# Patient Record
Sex: FEMALE | Race: WHITE | NOT HISPANIC OR LATINO | URBAN - METROPOLITAN AREA
[De-identification: names, ages, dates, MRNs, and addresses within clinical notes are randomized per-mention and may not be internally consistent; named-entity substitution may affect disease eponyms.]

---

## 2024-02-29 PROBLEM — 111552007: Status: ACTIVE | Noted: 2024-02-29

## 2024-02-29 PROBLEM — 40930008: Status: ACTIVE | Noted: 2024-02-29

## 2024-02-29 PROBLEM — 35489007: Status: ACTIVE | Noted: 2024-02-29

## 2024-02-29 PROBLEM — 389026000: Status: ACTIVE | Noted: 2024-02-29

## 2024-02-29 PROBLEM — 235595009: Status: ACTIVE | Noted: 2024-02-29

## 2024-02-29 PROBLEM — 219006: Status: ACTIVE | Noted: 2024-02-29

## 2024-02-29 PROBLEM — 197321007: Status: ACTIVE | Noted: 2024-02-29

## 2024-02-29 PROBLEM — 231504006: Status: ACTIVE | Noted: 2024-02-29

## 2024-02-29 PROBLEM — 35400008: Status: ACTIVE | Noted: 2024-02-29

## 2024-02-29 PROBLEM — 443913008: Status: ACTIVE | Noted: 2024-02-29

## 2024-02-29 PROBLEM — 59621000: Status: ACTIVE | Noted: 2024-02-29

## 2024-03-01 ENCOUNTER — OV NP (OUTPATIENT)
Dept: URBAN - METROPOLITAN AREA CLINIC 86 | Facility: CLINIC | Age: 74
End: 2024-03-01
Payer: MEDICARE

## 2024-03-01 ENCOUNTER — LAB (OUTPATIENT)
Dept: URBAN - METROPOLITAN AREA CLINIC 86 | Facility: CLINIC | Age: 74
End: 2024-03-01

## 2024-03-01 VITALS
HEART RATE: 98 BPM | TEMPERATURE: 97.9 F | SYSTOLIC BLOOD PRESSURE: 140 MMHG | WEIGHT: 171 LBS | BODY MASS INDEX: 29.19 KG/M2 | DIASTOLIC BLOOD PRESSURE: 76 MMHG | HEIGHT: 64 IN

## 2024-03-01 DIAGNOSIS — F32.A DEPRESSION, UNSPECIFIED: ICD-10-CM

## 2024-03-01 DIAGNOSIS — Z78.9 ALCOHOL USE: ICD-10-CM

## 2024-03-01 DIAGNOSIS — K76.0 FATTY LIVER: ICD-10-CM

## 2024-03-01 DIAGNOSIS — K21.9 CHRONIC GERD: ICD-10-CM

## 2024-03-01 DIAGNOSIS — I10 ESSENTIAL HYPERTENSION: ICD-10-CM

## 2024-03-01 DIAGNOSIS — E13.9 OTHER SPECIFIED DIABETES MELLITUS WITHOUT COMPLICATION, WITHOUT LONG-TERM CURRENT USE OF INSULIN: ICD-10-CM

## 2024-03-01 DIAGNOSIS — F41.9 ANXIETY DISORDER, UNSPECIFIED: ICD-10-CM

## 2024-03-01 DIAGNOSIS — E03.9 HYPOTHYROIDISM, UNSPECIFIED TYPE: ICD-10-CM

## 2024-03-01 DIAGNOSIS — Z71.85 VACCINE COUNSELING: ICD-10-CM

## 2024-03-01 DIAGNOSIS — E83.110 HEREDITARY HEMOCHROMATOSIS: ICD-10-CM

## 2024-03-01 PROCEDURE — 99205 OFFICE O/P NEW HI 60 MIN: CPT

## 2024-03-01 RX ORDER — CHOLECALCIFEROL (VITAMIN D3) 25 MCG
1 CAPSULE CAPSULE ORAL ONCE A DAY
Status: ACTIVE | COMMUNITY

## 2024-03-01 RX ORDER — LEVOTHYROXINE SODIUM 0.1 MG/1
TAKE 1 TABLET (100 MCG) BY ORAL ROUTE ONCE DAILY TABLET ORAL 1
Qty: 0 | Refills: 0 | Status: ACTIVE | COMMUNITY
Start: 1900-01-01

## 2024-03-01 RX ORDER — METFORMIN HYDROCHLORIDE 500 MG/1
1 TABLET WITH A MEAL TABLET, FILM COATED ORAL ONCE A DAY
Status: ACTIVE | COMMUNITY

## 2024-03-01 RX ORDER — LAMOTRIGINE 25 MG/1
1 TABLET TABLET ORAL
Status: ACTIVE | COMMUNITY

## 2024-03-01 RX ORDER — LOSARTAN POTASSIUM 50 MG/1
1 TABLET TABLET, FILM COATED ORAL ONCE A DAY
Status: ACTIVE | COMMUNITY

## 2024-03-01 RX ORDER — DULOXETINE 60 MG/1
CAPSULE, DELAYED RELEASE PELLETS ORAL
Qty: 0 | Refills: 0 | Status: ACTIVE | COMMUNITY
Start: 1900-01-01

## 2024-03-01 RX ORDER — AMLODIPINE BESYLATE 5 MG/1
TABLET ORAL
Qty: 0 | Refills: 0 | Status: ACTIVE | COMMUNITY
Start: 1900-01-01

## 2024-03-01 NOTE — HPI-TODAY'S VISIT:
Patient is a 73-year-old female last seen here at [facility] on May 1, 2018 by Dr. Lisandra Sebastian and now returns for evaluation of liver issues and fatty liver. She has underlying hfe  A copy of the note will be sent to Dr LUCAS.  No new notes sent.   She did scan u.s jan 22 2024: done at imaging center.  Liver normal sice but echogenic and no masses and vessels open and cbd 4mm and no stones and pancreas limited views normal and right kidney 11.6cm and no hydronephrosis and overall diffuse fatty liver and no suspcious mass.  Dr LUCAS told her to see us at least one time to talk re this.   SHe has hemochromatosis underlying and she has not done phlebotomy for this and highest 594 ferritin and took blood for while and not up in while and not up in several years.   She does not recll what type has.   2020 colonoscopy and did elsewhere for this and done locally.   [facility] notes : When she was last seen in 2018 she was being seen for colonoscopy and GERD evaluation.  Patient at that timeframe was reported to have a history of hemochromatosis but that information was not visible to Dr. Sebastian.  She also had been having occ 4 glasses of wine with ice a day and she was recommended to avoid same.  She is still amount so at risk to have more disease and we need to get the mri to see this and quantify the fat and look at this more.  Colonoscopy had been done in 2010 and so was recommended due in 2020.  Past medical history also showed history of anemia, anxiety, arthritis, diabetes, GERD, hemochromatosis, hypertension.  Past surgical history included abdominal hysterectomy 1990, colonoscopy 2010 with nonthrombosed external hemorrhoids and nonbleeding internal hemorrhoids.  EGD in July 2010 in March 2010.  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 pylori.  Meds in 2018 included amlodipine 5 mg, duloxetine 60 mg, ibuprofen 800 mg 3 times daily as needed, levothyroxine 100 mcg a day, omeprazole 20 mg a day, Seroquel 200 mg at night.  Allergies none.  Family history mother with GERD and brother with diabetes.  Social alcohol was having daily.  No tobacco.  Weight was 176 pounds height of 5 foot 4 BMI 30.38 in 2018 and now 2024 she is 173.  Discussed that the fatty liver has rf: weight andsome of that and she has the bottle of wine every night and that can lead to alcoholic fatty liver and worse yet cirrhosis over time,  Meeting Dr jo risk criteria and need that mri to look and be sure.  Do screens for a/bc and also do scores to look at livcer staus..  Plan 1.  Do basic labs to get the scores 2.  Do the mri to assess this further at [facility]. 3.  Check iron sat and ferritin and get records from Dr. LUCAS her  hematologist. 4.  Reassess post above. 5. Cautiined re the alcohol use and need to get the alcohol to be removed.  Duration of visit was 80  minutes with 40 minutes spent prepping chart from gw 2018 and back and loaded then loaded for the visit to [facility] records and then 40 additional minutes spent forthis face to face visit reviewing chart and events and plans with the pt.

## 2024-03-01 NOTE — EXAM-PHYSICAL EXAM
Gen: awake and responsive. Eyes: anicteric,normal lids. Mouth: normal lips. Nose: no drainage. Hearing: intact grossly. Neck: trachea midline and no jvd. CV: RRR no s3. Lungs: clear. No wheezes. Abd: Soft, nabs, nr,NT. Noted liver not tender and spleen tip not palpable. Ext: no sig edema, and some palm erythema. Both hands tendon 3/4 fingers thickened. Neuro: moves all 4 ext grossly. No asterixis. Skin: no pruritis.

## 2024-03-12 LAB
ABSOLUTE BASOPHILS: 29
ABSOLUTE EOSINOPHILS: 72
ABSOLUTE LYMPHOCYTES: 1291
ABSOLUTE MONOCYTES: 322
ABSOLUTE NEUTROPHILS: 3086
ALBUMIN/GLOBULIN RATIO: 1.9
ALBUMIN: 4.5
ALKALINE PHOSPHATASE: 70
ALT: 32
AST: 31
BASOPHILS: 0.6
BILIRUBIN, TOTAL: 0.4
BUN/CREATININE RATIO: (no result)
CALCIUM: 10
CARBON DIOXIDE: 30
CHLORIDE: 102
CREATININE: 0.67
EGFR: 93
EOSINOPHILS: 1.5
FERRITIN, SERUM: 37
FIB 4 INDEX: 2.29
FIB 4 INTERPRETATION: (no result)
GLOBULIN: 2.4
GLUCOSE: 230
HEMATOCRIT: 40.4
HEMOGLOBIN: 13.6
HEPATITIS A AB, TOTAL: (no result)
HEPATITIS B CORE AB TOTAL: (no result)
HEPATITIS B SURFACE ANTIBODY QL: (no result)
HEPATITIS B SURFACE ANTIGEN: (no result)
HEPATITIS C ANTIBODY: (no result)
HEREDITARY HEMOCHROMATOSIS DNA MUT: (no result)
INR: 1
IRON BIND.CAP.(TIBC): 341
IRON SATURATION: 36
IRON: 123
LYMPHOCYTES: 26.9
MCH: 32.9
MCHC: 33.7
MCV: 97.6
MONOCYTES: 6.7
MPV: 11.6
NEUTROPHILS: 64.3
PLATELET COUNT: 170
PLATELET COUNT: 170
POTASSIUM: 4.2
PROTEIN, TOTAL: 6.9
PT: 10.1
RDW: 11.8
RED BLOOD CELL COUNT: 4.14
SODIUM: 138
UREA NITROGEN (BUN): 14
WHITE BLOOD CELL COUNT: 4.8

## 2024-04-09 ENCOUNTER — TELEP (OUTPATIENT)
Dept: URBAN - METROPOLITAN AREA TELEHEALTH 2 | Facility: TELEHEALTH | Age: 74
End: 2024-04-09

## 2024-05-03 ENCOUNTER — OFFICE VISIT (OUTPATIENT)
Dept: URBAN - METROPOLITAN AREA TELEHEALTH 2 | Facility: TELEHEALTH | Age: 74
End: 2024-05-03
Payer: MEDICARE

## 2024-05-03 ENCOUNTER — DASHBOARD ENCOUNTERS (OUTPATIENT)
Age: 74
End: 2024-05-03

## 2024-05-03 VITALS — WEIGHT: 166 LBS | HEIGHT: 64 IN | BODY MASS INDEX: 28.34 KG/M2

## 2024-05-03 DIAGNOSIS — D49.0 IPMN (INTRADUCTAL PAPILLARY MUCINOUS NEOPLASM): ICD-10-CM

## 2024-05-03 DIAGNOSIS — K21.9 CHRONIC GERD: ICD-10-CM

## 2024-05-03 DIAGNOSIS — E83.110 HEREDITARY HEMOCHROMATOSIS: ICD-10-CM

## 2024-05-03 DIAGNOSIS — K76.0 FATTY LIVER: ICD-10-CM

## 2024-05-03 PROCEDURE — 99214 OFFICE O/P EST MOD 30 MIN: CPT

## 2024-05-03 RX ORDER — LAMOTRIGINE 25 MG/1
1 TABLET TABLET ORAL
Status: ACTIVE | COMMUNITY

## 2024-05-03 RX ORDER — DULOXETINE 60 MG/1
CAPSULE, DELAYED RELEASE PELLETS ORAL
Qty: 0 | Refills: 0 | Status: ACTIVE | COMMUNITY
Start: 1900-01-01

## 2024-05-03 RX ORDER — CHOLECALCIFEROL (VITAMIN D3) 25 MCG
1 CAPSULE CAPSULE ORAL ONCE A DAY
Status: ACTIVE | COMMUNITY

## 2024-05-03 RX ORDER — METFORMIN HYDROCHLORIDE 500 MG/1
1 TABLET WITH A MEAL TABLET, FILM COATED ORAL ONCE A DAY
Status: ACTIVE | COMMUNITY

## 2024-05-03 RX ORDER — LOSARTAN POTASSIUM 50 MG/1
1 TABLET TABLET, FILM COATED ORAL ONCE A DAY
Status: ACTIVE | COMMUNITY

## 2024-05-03 RX ORDER — AMLODIPINE BESYLATE 5 MG/1
TABLET ORAL
Qty: 0 | Refills: 0 | Status: ACTIVE | COMMUNITY
Start: 1900-01-01

## 2024-05-03 RX ORDER — LEVOTHYROXINE SODIUM 0.1 MG/1
TAKE 1 TABLET (100 MCG) BY ORAL ROUTE ONCE DAILY TABLET ORAL 1
Qty: 0 | Refills: 0 | Status: ACTIVE | COMMUNITY
Start: 1900-01-01

## 2024-10-01 ENCOUNTER — LAB OUTSIDE AN ENCOUNTER (OUTPATIENT)
Dept: URBAN - METROPOLITAN AREA TELEHEALTH 2 | Facility: TELEHEALTH | Age: 74
End: 2024-10-01

## 2024-10-04 ENCOUNTER — OFFICE VISIT (OUTPATIENT)
Dept: URBAN - NONMETROPOLITAN AREA CLINIC 1 | Facility: CLINIC | Age: 74
End: 2024-10-04

## 2024-10-07 ENCOUNTER — OFFICE VISIT (OUTPATIENT)
Dept: URBAN - NONMETROPOLITAN AREA CLINIC 1 | Facility: CLINIC | Age: 74
End: 2024-10-07
Payer: MEDICARE

## 2024-10-07 DIAGNOSIS — K76.0 FATTY (CHANGE OF) LIVER: ICD-10-CM

## 2024-10-07 PROCEDURE — 76705 ECHO EXAM OF ABDOMEN: CPT

## 2024-10-07 PROCEDURE — 93975 VASCULAR STUDY: CPT

## 2024-10-08 ENCOUNTER — TELEPHONE ENCOUNTER (OUTPATIENT)
Dept: URBAN - METROPOLITAN AREA CLINIC 86 | Facility: CLINIC | Age: 74
End: 2024-10-08

## 2024-10-08 NOTE — HPI-TODAY'S VISIT:
Aldair Hobson, October 7 ultrasound shows pancreas not well-seen due to gas and body habitus and limited assessment unremarkable. Liver even in echotexture with no discrete lesions. Liver vessels patent as expected. Gallbladder unremarkable with no stones. Common bile duct normal at 4 mm. Right kidney 11.5 cm with no hydronephrosis. Spleen normal at 10.2 cm with splenic vessels patent.   In summary, liver was even in echotexture and with no discrete lesions.  Liver vessels patent.  Important to confirm that the liver appears stable. Will review more at the visit. Dr. Sheriff

## 2024-11-04 ENCOUNTER — TELEPHONE ENCOUNTER (OUTPATIENT)
Dept: URBAN - METROPOLITAN AREA CLINIC 86 | Facility: CLINIC | Age: 74
End: 2024-11-04

## 2024-11-04 NOTE — HPI-TODAY'S VISIT:
Patient is a 74-year-old female last seen here May 2024 and prior had in May 1, 2018 seen by Dr. Lisandra Sebastian and  returns for evaluation of liver issues and fatty liver. She has underlying hfe.  A copy of the note will be sent to Dr MICHAEL Hobson,  October 7 ultrasound shows pancreas not well-seen due to gas and body habitus and limited assessment unremarkable.  Liver even in echotexture with no discrete lesions.  Liver vessels patent as expected.  Gallbladder unremarkable with no stones.  Common bile duct normal at 4 mm.  Right kidney 11.5 cm with no hydronephrosis.  Spleen normal at 10.2 cm with splenic vessels patent.    In summary, liver was even in echotexture and with no discrete lesions. Liver vessels patent. Important to confirm that the liver appears stable.  Will review more at the visit.  Dr. Sheriff  April 9 MRI showed mild limitation due to motion. No significant pleural or pericardial efusion seen. Liver was within normal limits in size with the right lobe 16.2 cm and no significant liver fat noted.  Fat estimate was a 3% which is in the normal range of up to 6%. Subcentimeter focus seen in the right lobe measuring 6 mm and is felt likely to be a small focus of shunting/perfusion defect.  Typically this is followed up again in 6 to 12 months. Gallbladder was unremarkable with no stones.  No bile duct dilation seen. Spleen was normal in size.  Adrenal glands unremarkable. Pancreas showed cystic lesion in the body area measuring 3 mm in the main pancreatic duct was mildly prominent in the pancreatic head measuring 4 mm which is upper normal.  Pancreas otherwise unremarkable. Kidneys with no hydronephrosis. Liver vessels patent. No mechanical obstruction seen. They recommended again a follow-up MRI in 12 months for your perfusion defect in the liver and they mentioned that you had this punctate subcentimeter cyst in the pancreatic body which would also require follow-up at that time.  March 1 labs show iron saturation normal at 36% and ferritin normal at 37. She has not needed any more phlebotomy. WBC 4.8 hemoglobin 13.6 plate count 170 MCV 97.6 with normal neutrophils and lymphocytes this is good to see. INR normal at 1.0. Hep B core total was nonreactive indicating no prior exposure and hep B surface antigen was negative indicating no active disease. Your hepatitis A total antibody was negative indicating no immunity you should consider getting the hep A/B vaccine series. Hep C antibody was negative which was good to note. Your fib 4 index was 2.29 which would put you at intermediate risk category for having advanced fibrosis. Your sugar was elevated at 230 and it is important that you work on this issue long-term with your providers. BUN 14 creatinine 0.67 sodium 138 potassium 4.2 calcium 10.0 albumin 4.5 bilirubin 0.4. Alk phos was normal at 70 AST was normal at 21 but ALT was slightly elevated at 32 with ideal ALT less than 25. Platelet count was 170. The hemochromatosis assay finally confirmed what it is that you have. You have 1 gene of the C282 Y hemochromatosis gene and 1 gene of the H63D gene. This is confirmation now of what you have. The main shoulder border view could receive 1 or the other of these. But both were received a gene that was defective. The C282 Y is to more iron retaining potential gene to have. As you recall, you have been known to have this history of the fatty liver and iron issues and to see your hematologist for that. Do not have confirmed what type you have.    Records reviewed from Dr LUCAS. Jan 24 2024 wbc 4.1 and hg 13.8 and platelest 162, Neutrophils 2.5.  Afp 3 and gl;ucose 211 and so that needs to be worked on as high sugars add to liver fat also. Bun 15 and cr 0.8. Na 141 and k 4.2 and cl 106 and co2 26.9 and na 141 and k 4.2 and ca 9.4 and tb 0.4 and alk 80 and ast 31 and alt 40. Ideal alt less than 25. Iron sat 33.7% and ferritin 41.9. TSH less than 0.0 and defer to team. Vitamin d 75.1 normal.  She did scan u.s jan 22 2024: done at imaging center.  Liver normal  but echogenic and no masses and vessels open and cbd 4mm and no stones and pancreas limited views normal and right kidney 11.6cm and no hydronephrosis and overall diffuse fatty liver and no suspcious mass.  Dr LUCAS told her to see us at least one time to talk re this.   SHe has hemochromatosis underlying and she has not done phlebotomy for this and highest 594 ferritin and took blood for while and not up in while and not up in several years.   She does not recll what type has.   2020 colonoscopy and did elsewhere for this and done locally.   [facility] notes : When she was last seen in 2018 she was being seen for colonoscopy and GERD evaluation.  Patient at that timeframe was reported to have a history of hemochromatosis but that information was not visible to Dr. Sebastian.  She also had been having occ 4 glasses of wine with ice a day and she was recommended to avoid same.  She is still amount so at risk to have more disease and we need to get the mri to see this and quantify the fat and look at this more.  Colonoscopy had been done in 2010 and so was recommended due in 2020.  Past medical history also showed history of anemia, anxiety, arthritis, diabetes, GERD, hemochromatosis, hypertension.  Past surgical history included abdominal hysterectomy 1990, colonoscopy 2010 with nonthrombosed external hemorrhoids and nonbleeding internal hemorrhoids. EGD in July 2010 in March 2010. 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 pylori.  Meds in 2018 included amlodipine 5 mg, duloxetine 60 mg, ibuprofen 800 mg 3 times daily as needed, levothyroxine 100 mcg a day, omeprazole 20 mg a day, Seroquel 200 mg at night.  Allergies none.  Family history mother with GERD and brother with diabetes.  Social alcohol was having daily. No tobacco.  Weight was 176 pounds height of 5 foot 4 BMI 30.38 in 2018 and now 2024 she is 173.  Discussed that the fatty liver has rf: weight andsome of that and she has the bottle of wine every night and that can lead to alcoholic fatty liver and worse yet cirrhosis over time,  Meeting Dr jo risk criteria and need that mri to look and be sure.  Do screens for a/bc and also do scores to look at livcer staus..  Plan 1.  PLan for the u.s in Oct. 2.  Pt sees Dr RODRIGUEZ 3.  Pt will do labs then and see us in 6m. 4.  Social more rare now and not as much as before   Duration of visit was   minutes with 10 minutes spent prepping chart and loaded then loaded for the visit to [facility] records and then 20 additional minutes spent for this dox video with time spent reviewing chart and events and plans with the pt.

## 2024-11-05 ENCOUNTER — OFFICE VISIT (OUTPATIENT)
Dept: URBAN - METROPOLITAN AREA TELEHEALTH 2 | Facility: TELEHEALTH | Age: 74
End: 2024-11-05
Payer: MEDICARE

## 2024-11-05 VITALS — HEIGHT: 64 IN | WEIGHT: 162 LBS | BODY MASS INDEX: 27.66 KG/M2

## 2024-11-05 DIAGNOSIS — O99.313: ICD-10-CM

## 2024-11-05 DIAGNOSIS — K21.9 CHRONIC GERD: ICD-10-CM

## 2024-11-05 DIAGNOSIS — F41.9 ANXIETY DISORDER, UNSPECIFIED: ICD-10-CM

## 2024-11-05 DIAGNOSIS — I10 ESSENTIAL HYPERTENSION: ICD-10-CM

## 2024-11-05 DIAGNOSIS — D49.0 IPMN (INTRADUCTAL PAPILLARY MUCINOUS NEOPLASM): ICD-10-CM

## 2024-11-05 DIAGNOSIS — E13.9 OTHER SPECIFIED DIABETES MELLITUS WITHOUT COMPLICATION, WITHOUT LONG-TERM CURRENT USE OF INSULIN: ICD-10-CM

## 2024-11-05 DIAGNOSIS — F10.91 ALCOHOL USE DISORDER IN REMISSION: ICD-10-CM

## 2024-11-05 DIAGNOSIS — K76.0 FATTY LIVER: ICD-10-CM

## 2024-11-05 DIAGNOSIS — E83.110 HEREDITARY HEMOCHROMATOSIS: ICD-10-CM

## 2024-11-05 DIAGNOSIS — F32.A DEPRESSION, UNSPECIFIED: ICD-10-CM

## 2024-11-05 DIAGNOSIS — E66.3 OVERWEIGHT: ICD-10-CM

## 2024-11-05 DIAGNOSIS — Z71.85 VACCINE COUNSELING: ICD-10-CM

## 2024-11-05 DIAGNOSIS — E03.9 HYPOTHYROIDISM, UNSPECIFIED TYPE: ICD-10-CM

## 2024-11-05 PROBLEM — 92264007: Status: ACTIVE | Noted: 2024-11-05

## 2024-11-05 PROBLEM — 238131007: Status: ACTIVE | Noted: 2024-11-05

## 2024-11-05 PROBLEM — 15167005: Status: ACTIVE | Noted: 2024-11-05

## 2024-11-05 PROCEDURE — 99214 OFFICE O/P EST MOD 30 MIN: CPT

## 2024-11-05 RX ORDER — ST. JOHN'S WORT 300 MG
1 CAPSULE CAPSULE ORAL ONCE A DAY
Status: ACTIVE | COMMUNITY

## 2024-11-05 RX ORDER — GABAPENTIN 100 MG/1
1 CAPSULE AT BEDTIME CAPSULE ORAL ONCE A DAY
Status: DISCONTINUED | COMMUNITY

## 2024-11-05 RX ORDER — LEVOTHYROXINE SODIUM 0.1 MG/1
TAKE 1 TABLET (100 MCG) BY ORAL ROUTE ONCE DAILY TABLET ORAL 1
Qty: 0 | Refills: 0 | Status: ACTIVE | COMMUNITY
Start: 1900-01-01

## 2024-11-05 RX ORDER — LOSARTAN POTASSIUM 50 MG/1
1 TABLET TABLET, FILM COATED ORAL ONCE A DAY
Status: ACTIVE | COMMUNITY

## 2024-11-05 RX ORDER — LAMOTRIGINE 25 MG/1
1 TABLET TABLET ORAL
Status: ACTIVE | COMMUNITY

## 2024-11-05 RX ORDER — CHOLECALCIFEROL (VITAMIN D3) 25 MCG
1 CAPSULE CAPSULE ORAL ONCE A DAY
Status: ACTIVE | COMMUNITY

## 2024-11-05 RX ORDER — DULOXETINE 60 MG/1
CAPSULE, DELAYED RELEASE PELLETS ORAL
Qty: 0 | Refills: 0 | Status: ACTIVE | COMMUNITY
Start: 1900-01-01

## 2024-11-05 RX ORDER — AMLODIPINE BESYLATE 5 MG/1
TABLET ORAL
Qty: 0 | Refills: 0 | Status: ACTIVE | COMMUNITY
Start: 1900-01-01

## 2024-11-05 RX ORDER — METFORMIN HYDROCHLORIDE 500 MG/1
1 TABLET WITH A MEAL TABLET, FILM COATED ORAL ONCE A DAY
Status: ACTIVE | COMMUNITY

## 2024-11-05 NOTE — HPI-TODAY'S VISIT:
Patient is a 74-year-old female last seen here May 2024 and prior had in May 1, 2018 seen by Dr. Lisandra Sebastian and  returns for evaluation of liver issues and fatty liver and has underlying hfe.  A copy of the note will be sent to Dr LUCAS.  October 7 ultrasound shows pancreas not well-seen due to gas and body habitus and limited assessment unremarkable. Liver even in echotexture with no discrete lesions. Liver vessels patent as expected. Gallbladder unremarkable with no stones. Common bile duct normal at 4 mm. Right kidney 11.5 cm with no hydronephrosis. Spleen normal at 10.2 cm with splenic vessels patent.  In summary, liver was even in echotexture and with no discrete lesions. Liver vessels patent. Important to confirm that the liver appears stable.  [facility] april 2025 mri to follow up.  She did labs with Dr LUCAS's labs and need those labs,  April 9 MRI showed mild limitation due to motion. No significant pleural or pericardial efusion seen. Liver was within normal limits in size with the right lobe 16.2 cm and no significant liver fat noted.  Fat estimate was a 3% which is in the normal range of up to 6%. Subcentimeter focus seen in the right lobe measuring 6 mm and is felt likely to be a small focus of shunting/perfusion defect.  Typically this is followed up again in 6 to 12 months. Gallbladder was unremarkable with no stones.  No bile duct dilation seen. Spleen was normal in size.  Adrenal glands unremarkable. Pancreas showed cystic lesion in the body area measuring 3 mm in the main pancreatic duct was mildly prominent in the pancreatic head measuring 4 mm which is upper normal.  Pancreas otherwise unremarkable. Kidneys with no hydronephrosis. Liver vessels patent. No mechanical obstruction seen. They recommended again a follow-up MRI in 12 months for your perfusion defect in the liver and they mentioned that you had this punctate subcentimeter cyst in the pancreatic body which would also require follow-up at that time.  March 1 labs show iron saturation normal at 36% and ferritin normal at 37. She has not needed any more phlebotomy. WBC 4.8 hemoglobin 13.6 plate count 170 MCV 97.6 with normal neutrophils and lymphocytes this is good to see. INR normal at 1.0. Hep B core total was nonreactive indicating no prior exposure and hep B surface antigen was negative indicating no active disease. Your hepatitis A total antibody was negative indicating no immunity you should consider getting the hep A/B vaccine series. Hep C antibody was negative which was good to note. Your fib 4 index was 2.29 which would put you at intermediate risk category for having advanced fibrosis. Your sugar was elevated at 230 and it is important that you work on this issue long-term with your providers. BUN 14 creatinine 0.67 sodium 138 potassium 4.2 calcium 10.0 albumin 4.5 bilirubin 0.4. Alk phos was normal at 70 AST was normal at 21 but ALT was slightly elevated at 32 with ideal ALT less than 25. Platelet count was 170. The hemochromatosis assay finally confirmed what it is that you have. You have 1 gene of the C282 Y hemochromatosis gene and 1 gene of the H63D gene. This is confirmation now of what you have. The main shoulder border view could receive 1 or the other of these. But both were received a gene that was defective. The C282 Y is to more iron retaining potential gene to have. As you recall, you have been known to have this history of the fatty liver and iron issues and to see your hematologist for that. Do not have confirmed what type you have.    Records reviewed from Dr LUCAS. Jan 24 2024 wbc 4.1 and hg 13.8 and platelest 162, Neutrophils 2.5.  Afp 3 and glucose 211 and so that needs to be worked on as high sugars add to liver fat also. Bun 15 and cr 0.8. Na 141 and k 4.2 and cl 106 and co2 26.9 and na 141 and k 4.2 and ca 9.4 and tb 0.4 and alk 80 and ast 31 and alt 40. Ideal alt less than 25. Iron sat 33.7% and ferritin 41.9. TSH less than 0.0 and defer to team. Vitamin d 75.1 normal.  She did scan u.s jan 22 2024: done at imaging center.  Liver normal  but echogenic and no masses and vessels open and cbd 4mm and no stones and pancreas limited views normal and right kidney 11.6cm and no hydronephrosis and overall diffuse fatty liver and no suspcious mass.  SHe has hemochromatosis and did do one this year and prior had gone a while since she did that.  594 ferritin and took blood for while and not up in while and not up in several years and then bumped and she did in 2024.  She does not recll what type has.   2020 colonoscopy and did elsewhere for this and done locally.   [facility] notes : When she was last seen in 2018 she was being seen for colonoscopy and GERD evaluation.  Patient at that timeframe was reported to have a history of hemochromatosis but that information was not visible to Dr. Sebastian.  She was asked re social alcohol and having biweekly 1-2 drinks and better as prior 4 glasses of wine with ice a day and she was recommended to avoid same.  She is still amount so at risk to have more disease and we need to get the mri to see this and quantify the fat and look at this more.  Colonoscopy had been done in 2010 and so was recommended due in 2020.  Past medical history also showed history of anemia, anxiety, arthritis, diabetes, GERD, hemochromatosis, hypertension.  Past surgical history included abdominal hysterectomy 1990, colonoscopy 2010 with nonthrombosed external hemorrhoids and nonbleeding internal hemorrhoids. EGD in July 2010 in March 2010. 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 pylori.  Meds in 2018 included amlodipine 5 mg, duloxetine 60 mg, ibuprofen 800 mg 3 times daily as needed, levothyroxine 100 mcg a day, omeprazole 20 mg a day, Seroquel 200 mg at night.  Allergies none.  Family history mother with GERD and brother with diabetes.  Social alcohol was having daily. No tobacco.  Weight was 176 pounds height of 5 foot 4 BMI 30.38 in 2018 and now 2024 she is 173.  Discussed that the fatty liver has rf: weight andsome of that and she has the bottle of wine every night and that can lead to alcoholic fatty liver and worse yet cirrhosis over time,  Meeting Dr jo risk criteria and need that mri to look and be sure.  Do screens for a/bc and also do scores to look at livcer staus..  Plan 1.  Plan for the Mri in april [facility] and see if can time with Dr LUCAS appt. 2.  Pt will stay on lowest possible alcohol and u.s and mri both not fatty and liver looks lisbet. 3.  Pt sees Dr LUCAS in Jan. 4.   We need Dr LUCAS labs. 5.  See in 6m and mri in april.   Duration of visit was 31 minutes with 10 minutes spent prepping chart and loaded then loaded for the visit to [facility] records and then 21 additional minutes spent for this dox video with time spent reviewing chart and events and plans with the pt.

## 2024-12-02 ENCOUNTER — TELEPHONE ENCOUNTER (OUTPATIENT)
Dept: URBAN - METROPOLITAN AREA CLINIC 86 | Facility: CLINIC | Age: 74
End: 2024-12-02

## 2024-12-02 NOTE — HPI-TODAY'S VISIT:
Dear Radha Hobson,  November 25 MRI done showed liver fat to be normal range at 3% which was good to confirm.  This was similar to your prior scan.  It was not felt to be a fatty liver.  Normal-sized spleen was seen and there was a possible 3 mm white lesion in the pancreas tail that was very small.  Adrenal glands were unremarkable.  No evidence of any iron deposition seen in the liver or spleen.  No enhancing liver lesions.  No signs of cirrhosis.  Portal vein was patent as expected.  Gallbladder was present.  No pathologic bile duct dilation seen on the images.  8 mm peripheral arterial focus seen in segment 6/7 unchanged in the liver from prior exam.   Overall, nearly normal liver exam with some possible minute cystic lesions in the pancreas there were difficult to evaluate due to small size.  Small enhancing focus seen in the liver as well. felt to be a perfusion change that was unchanged.  Due to this we will recommend a 6-month follow-up to be done for you in May.  Since we are planning to see you on April 8 we will see how you are doing check her labs and then order the MRI at that point.  Typically we would recommend a 6-month surveillance with any perfusion changes.  Since they did mention that it was unchanged dating back to 2014 , one could argue to do it once a year and do an ultrasound instead next time.  We will decide at that point.  Dr. Sheriff

## 2025-04-01 ENCOUNTER — LAB OUTSIDE AN ENCOUNTER (OUTPATIENT)
Dept: URBAN - METROPOLITAN AREA TELEHEALTH 2 | Facility: TELEHEALTH | Age: 75
End: 2025-04-01

## 2025-04-04 ENCOUNTER — TELEPHONE ENCOUNTER (OUTPATIENT)
Dept: URBAN - METROPOLITAN AREA CLINIC 86 | Facility: CLINIC | Age: 75
End: 2025-04-04

## 2025-04-08 ENCOUNTER — OFFICE VISIT (OUTPATIENT)
Dept: URBAN - METROPOLITAN AREA TELEHEALTH 2 | Facility: TELEHEALTH | Age: 75
End: 2025-04-08
Payer: MEDICARE

## 2025-04-08 DIAGNOSIS — K21.9 CHRONIC GERD: ICD-10-CM

## 2025-04-08 DIAGNOSIS — E13.9 OTHER SPECIFIED DIABETES MELLITUS WITHOUT COMPLICATION, WITHOUT LONG-TERM CURRENT USE OF INSULIN: ICD-10-CM

## 2025-04-08 DIAGNOSIS — Z71.85 VACCINE COUNSELING: ICD-10-CM

## 2025-04-08 DIAGNOSIS — F10.91 ALCOHOL USE DISORDER IN REMISSION: ICD-10-CM

## 2025-04-08 DIAGNOSIS — D49.0 IPMN (INTRADUCTAL PAPILLARY MUCINOUS NEOPLASM): ICD-10-CM

## 2025-04-08 DIAGNOSIS — F41.9 ANXIETY DISORDER, UNSPECIFIED: ICD-10-CM

## 2025-04-08 DIAGNOSIS — O99.313: ICD-10-CM

## 2025-04-08 DIAGNOSIS — K76.89 LIVER LESION: ICD-10-CM

## 2025-04-08 DIAGNOSIS — E83.110 HEREDITARY HEMOCHROMATOSIS: ICD-10-CM

## 2025-04-08 DIAGNOSIS — K76.0 FATTY LIVER: ICD-10-CM

## 2025-04-08 DIAGNOSIS — I10 ESSENTIAL HYPERTENSION: ICD-10-CM

## 2025-04-08 DIAGNOSIS — E66.3 OVERWEIGHT: ICD-10-CM

## 2025-04-08 DIAGNOSIS — E03.9 HYPOTHYROIDISM, UNSPECIFIED TYPE: ICD-10-CM

## 2025-04-08 DIAGNOSIS — F32.A DEPRESSION, UNSPECIFIED: ICD-10-CM

## 2025-04-08 PROCEDURE — 99214 OFFICE O/P EST MOD 30 MIN: CPT

## 2025-04-08 RX ORDER — AMLODIPINE BESYLATE 5 MG/1
TABLET ORAL
Qty: 0 | Refills: 0 | Status: ACTIVE | COMMUNITY
Start: 1900-01-01

## 2025-04-08 RX ORDER — DULOXETINE 60 MG/1
CAPSULE, DELAYED RELEASE PELLETS ORAL
Qty: 0 | Refills: 0 | Status: ACTIVE | COMMUNITY
Start: 1900-01-01

## 2025-04-08 RX ORDER — ST. JOHN'S WORT 300 MG
1 CAPSULE CAPSULE ORAL ONCE A DAY
Status: ACTIVE | COMMUNITY

## 2025-04-08 RX ORDER — LOSARTAN POTASSIUM 50 MG/1
1 TABLET TABLET, FILM COATED ORAL ONCE A DAY
Status: ACTIVE | COMMUNITY

## 2025-04-08 RX ORDER — LEVOTHYROXINE SODIUM 0.1 MG/1
TAKE 1 TABLET (100 MCG) BY ORAL ROUTE ONCE DAILY TABLET ORAL 1
Qty: 0 | Refills: 0 | Status: ACTIVE | COMMUNITY
Start: 1900-01-01

## 2025-04-08 RX ORDER — METFORMIN HYDROCHLORIDE 500 MG/1
1 TABLET WITH A MEAL TABLET, FILM COATED ORAL ONCE A DAY
Status: ACTIVE | COMMUNITY

## 2025-04-08 RX ORDER — CHOLECALCIFEROL (VITAMIN D3) 25 MCG
1 CAPSULE CAPSULE ORAL ONCE A DAY
Status: ACTIVE | COMMUNITY

## 2025-04-08 RX ORDER — LAMOTRIGINE 25 MG/1
1 TABLET TABLET ORAL
Status: ACTIVE | COMMUNITY

## 2025-04-08 NOTE — HPI-TODAY'S VISIT:
Patient is a 74-year-old female last seen here Nov 2024 and prior had in May 1, 2018 seen by Dr. Lisandra Sebastian and  returns for evaluation of liver issues and fatty liver and has underlying hfe.  A copy of the note will be sent to Dr LUCAS.  ER notes sent: SHe confirmed 2 yrs ago had that. Patient did a CT abdomen and pelvis on October 4, 2023 which showed questionable left-sided colonic wall thickening that could be colitis.  No acute intra-abdominal or intrapelvic process seen.  Small hiatal hernia.  Sigmoid diverticulosis seen and no diverticulitis. October 4, 2023 labs showed WBC 6.63 hemoglobin 13.3 MCV 98.5 platelet 184.  Neutrophils 5.4 lymphocytes 0.9.  Sodium 140 potassium 4.0 chloride 103 CO2 25 glucose elevated 185 BUN 14.  0.9 calcium 9.8 total protein 7.3 albumin 4.1 AST 28 ALT 38 alk phos 87 bilirubin 0.4. They concluded that she had colitis and gave her Cipro, dicyclomine, metronidazole, and Zofran.    November 25 MRI done showed liver fat to be normal range at 3% which was good to confirm. This was similar to your prior scan. It was not felt to be a fatty liver. Normal-sized spleen was seen and there was a possible 3 mm lesion in the pancreas tail that was very small. Adrenal glands were unremarkable. No evidence of any iron deposition seen in the liver or spleen. No enhancing liver lesions. No signs of cirrhosis. Portal vein was patent as expected. Gallbladder was present. No pathologic bile duct dilation seen on the images. 8 mm peripheral arterial focus seen in segment 6/7 unchanged in the liver from prior exam.  Overall, nearly normal liver exam with some possible minute cystic lesions in the pancreas there were difficult to evaluate due to small size. Small enhancing focus seen in the liver as well. felt to be a perfusion change that was unchanged.  Due to this we will recommend a 6-month follow-up to be done for you in May. Since we are planning to see you on April 8 we will see how you are doing check her labs and then order the MRI at that point.  Typically we would recommend a 6-month surveillance with any perfusion changes. Since they did mention that it was unchanged dating back to 2014 , one could argue to do it once a year and do an ultrasound instead next time.  October 7 2024  ultrasound shows pancreas not well-seen due to gas and body habitus and limited assessment unremarkable. Liver even in echotexture with no discrete lesions. Liver vessels patent as expected. Gallbladder unremarkable with no stones. Common bile duct normal at 4 mm. Right kidney 11.5 cm with no hydronephrosis. Spleen normal at 10.2 cm with splenic vessels patent.  In summary, liver was even in echotexture and with no discrete lesions. Liver vessels patent. Important to confirm that the liver appears stable.  She says labs to be done next month at Dr LUCAS.  She did labs with Dr LUCAS's labs and need those labs.  April 9 2024 MRI showed mild limitation due to motion. No significant pleural or pericardial efusion seen. Liver was within normal limits in size with the right lobe 16.2 cm and no significant liver fat noted.  Fat estimate was a 3% which is in the normal range of up to 6%. Subcentimeter focus seen in the right lobe measuring 6 mm and is felt likely to be a small focus of shunting/perfusion defect.  Typically this is followed up again in 6 to 12 months. Gallbladder was unremarkable with no stones.  No bile duct dilation seen. Spleen was normal in size.  Adrenal glands unremarkable. Pancreas showed cystic lesion in the body area measuring 3 mm in the main pancreatic duct was mildly prominent in the pancreatic head measuring 4 mm which is upper normal.  Pancreas otherwise unremarkable. Kidneys with no hydronephrosis. Liver vessels patent. No mechanical obstruction seen. They recommended again a follow-up MRI in 12 months for your perfusion defect in the liver and they mentioned that you had this punctate subcentimeter cyst in the pancreatic body which would also require follow-up at that time.  March 1 labs show iron saturation normal at 36% and ferritin normal at 37. She has not needed any more phlebotomy. WBC 4.8 hemoglobin 13.6 plate count 170 MCV 97.6 with normal neutrophils and lymphocytes this is good to see. INR normal at 1.0. Hep B core total was nonreactive indicating no prior exposure and hep B surface antigen was negative indicating no active disease. Your hepatitis A total antibody was negative indicating no immunity you should consider getting the hep A/B vaccine series. Hep C antibody was negative which was good to note. Your fib 4 index was 2.29 which would put you at intermediate risk category for having advanced fibrosis. Your sugar was elevated at 230 and it is important that you work on this issue long-term with your providers. BUN 14 creatinine 0.67 sodium 138 potassium 4.2 calcium 10.0 albumin 4.5 bilirubin 0.4. Alk phos was normal at 70 AST was normal at 21 but ALT was slightly elevated at 32 with ideal ALT less than 25. Platelet count was 170. The hemochromatosis assay finally confirmed what it is that you have. You have 1 gene of the C282 Y hemochromatosis gene and 1 gene of the H63D gene. This is confirmation now of what you have. The main shoulder border view could receive 1 or the other of these. But both were received a gene that was defective. The C282 Y is to more iron retaining potential gene to have. As you recall, you have been known to have this history of the fatty liver and iron issues and to see your hematologist for that. Do not have confirmed what type you have.    Records reviewed from Dr LUCAS. Jan 24 2024 wbc 4.1 and hg 13.8 and platelest 162, Neutrophils 2.5.  Afp 3 and glucose 211 and so that needs to be worked on as high sugars add to liver fat also. Bun 15 and cr 0.8. Na 141 and k 4.2 and cl 106 and co2 26.9 and na 141 and k 4.2 and ca 9.4 and tb 0.4 and alk 80 and ast 31 and alt 40. Ideal alt less than 25. Iron sat 33.7% and ferritin 41.9. TSH less than 0.0 and defer to team. Vitamin d 75.1 normal.  She did scan u.s jan 22 2024: done at imaging center.  Liver normal  but echogenic and no masses and vessels open and cbd 4mm and no stones and pancreas limited views normal and right kidney 11.6cm and no hydronephrosis and overall diffuse fatty liver and no suspcious mass.  SHe has hemochromatosis and did do one this year and prior had gone a while since she did that.  594 ferritin and took blood for while and not up in while and not up in several years and then bumped and she did in 2024.  She does not recll what type has.   2020 colonoscopy and did elsewhere for this and done locally.   [facility] notes : When she was last seen in 2018 she was being seen for colonoscopy and GERD evaluation.  Patient at that timeframe was reported to have a history of hemochromatosis but that information was not visible to Dr. Sebastian.  She was asked re social alcohol and having biweekly 1-2 drinks and better as prior 4 glasses of wine with ice a day and she was recommended to avoid same.  She is still amount so at risk to have more disease and we need to get the mri to see this and quantify the fat and look at this more.  Colonoscopy had been done in 2010 and so was recommended due in 2020.  Past medical history also showed history of anemia, anxiety, arthritis, diabetes, GERD, hemochromatosis, hypertension.  Past surgical history included abdominal hysterectomy 1990, colonoscopy 2010 with nonthrombosed external hemorrhoids and nonbleeding internal hemorrhoids. EGD in July 2010 in March 2010. 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 pylori.  Meds in 2018 included amlodipine 5 mg, duloxetine 60 mg, ibuprofen 800 mg 3 times daily as needed, levothyroxine 100 mcg a day, omeprazole 20 mg a day, Seroquel 200 mg at night.  Allergies none.  Family history mother with GERD and brother with diabetes.  Social alcohol was having daily. No tobacco.  Weight was 176 pounds height of 5 foot 4 BMI 30.38 in 2018 and now 2024 she is 173.  Discussed that the fatty liver has rf: weight andsome of that and she has the bottle of wine every night and that can lead to alcoholic fatty liver and worse yet cirrhosis over time,  Meeting Dr jo risk criteria and need that mri to look and be sure.  Do screens for a/bc and also do scores to look at livcer staus..  Plan 1.  Plan for the Mri in May [facility]. 2.  Pt did labs Dr LUCAS and need to get. 3.  She says alcohol has 1x a month. 4.   The liver not fatty. 5.   Stay on healthy diet and says eating better on sugars, 6.   RTC on 6m to see us.   Duration of visit was 30 minutes with 10 minutes spent prepping chart and loaded then loaded for the visit to [facility] records and then 20 additional minutes spent for this dox video with time spent reviewing chart and events and plans with the pt.

## 2025-04-24 ENCOUNTER — TELEPHONE ENCOUNTER (OUTPATIENT)
Dept: URBAN - METROPOLITAN AREA CLINIC 86 | Facility: CLINIC | Age: 75
End: 2025-04-24

## 2025-04-24 NOTE — HPI-TODAY'S VISIT:
Dear Radha Hobson,    April 22 MRI from [facility] sent to us.   The lung bases were unremarkable were seen but the images were somewhat degraded through the breath-holding process.   The liver was without suspicious lesion.  There was no sign to suggest significant iron deposition.   No significant fat deposition seen as well with the calculated fat fraction being 2%.   Small focus of enhancement seen in segment 6/7 measuring 0.8 x 0.5 cm and was unchanged dating back to April 9 2024 when allowing for differences in measurement technique.  No new suspicious lesions seen and no washout lesion was seen.  Portal and hepatic veins were patent/open as expected.   There was no duct dilation intra or extrahepatic leak.   No gallstones were seen.   Pancreas head possible tiny T2 bright foci there was noted, and they reference some small lesions 2 to 3 mm and 2 mm they were all felt to be probably unchanged.   Adrenal glands were preserved.   Spleen was preserved.   A tiny fat-containing umbilical hernia was seen.   No hydroureteronephrosis was seen of the kidneys.   Small hiatal hernia was seen.   Celiac, SMA, SMV and splenic vessels were patent/open as expected.    Overall, stable small focus of enhancement seen in segment 6/7 unchanged 8.9 x 24 with no new suspicious lesion.  They felt that this spot could be a protrusion mildly or small FNH.   There was no evidence of significant iron deposition in the liver fat was normal at 2%.   Tiny foci seen in the pancreas could represent IPMN's and they recommend a follow-up MRI/MRCP in 1 year to document stability of these.Small hiatal hernia was seen.    November 2024 MRI showed 3% fat, 3 mm lesion in the pancreas tail, in the unchanged segment 6/7 lesion in the liver.    Please share with local providers.   Dr. Sheriff.

## 2025-05-01 ENCOUNTER — LAB OUTSIDE AN ENCOUNTER (OUTPATIENT)
Dept: URBAN - METROPOLITAN AREA TELEHEALTH 2 | Facility: TELEHEALTH | Age: 75
End: 2025-05-01

## 2025-05-07 ENCOUNTER — TELEPHONE ENCOUNTER (OUTPATIENT)
Dept: URBAN - METROPOLITAN AREA CLINIC 86 | Facility: CLINIC | Age: 75
End: 2025-05-07

## 2025-07-10 ENCOUNTER — OFFICE VISIT (OUTPATIENT)
Dept: URBAN - METROPOLITAN AREA TELEHEALTH 2 | Facility: TELEHEALTH | Age: 75
End: 2025-07-10
Payer: MEDICARE

## 2025-07-10 DIAGNOSIS — K76.0 FATTY LIVER: ICD-10-CM

## 2025-07-10 DIAGNOSIS — O99.313: ICD-10-CM

## 2025-07-10 DIAGNOSIS — E03.9 HYPOTHYROIDISM, UNSPECIFIED TYPE: ICD-10-CM

## 2025-07-10 DIAGNOSIS — E66.3 OVERWEIGHT: ICD-10-CM

## 2025-07-10 DIAGNOSIS — K21.9 CHRONIC GERD: ICD-10-CM

## 2025-07-10 DIAGNOSIS — I10 ESSENTIAL HYPERTENSION: ICD-10-CM

## 2025-07-10 DIAGNOSIS — F41.9 ANXIETY DISORDER, UNSPECIFIED: ICD-10-CM

## 2025-07-10 DIAGNOSIS — E83.110 HEREDITARY HEMOCHROMATOSIS: ICD-10-CM

## 2025-07-10 DIAGNOSIS — Z71.85 VACCINE COUNSELING: ICD-10-CM

## 2025-07-10 DIAGNOSIS — K76.89 LIVER LESION: ICD-10-CM

## 2025-07-10 DIAGNOSIS — D49.0 IPMN (INTRADUCTAL PAPILLARY MUCINOUS NEOPLASM): ICD-10-CM

## 2025-07-10 DIAGNOSIS — E13.9 OTHER SPECIFIED DIABETES MELLITUS WITHOUT COMPLICATION, WITHOUT LONG-TERM CURRENT USE OF INSULIN: ICD-10-CM

## 2025-07-10 DIAGNOSIS — F32.A DEPRESSION, UNSPECIFIED: ICD-10-CM

## 2025-07-10 DIAGNOSIS — F10.91 ALCOHOL USE DISORDER IN REMISSION: ICD-10-CM

## 2025-07-10 PROCEDURE — 99214 OFFICE O/P EST MOD 30 MIN: CPT

## 2025-07-10 RX ORDER — DULOXETINE 60 MG/1
CAPSULE, DELAYED RELEASE PELLETS ORAL
Qty: 0 | Refills: 0 | Status: ACTIVE | COMMUNITY
Start: 1900-01-01

## 2025-07-10 RX ORDER — CHOLECALCIFEROL (VITAMIN D3) 25 MCG
1 CAPSULE CAPSULE ORAL ONCE A DAY
Status: ACTIVE | COMMUNITY

## 2025-07-10 RX ORDER — LAMOTRIGINE 25 MG/1
1 TABLET TABLET ORAL
Status: ACTIVE | COMMUNITY

## 2025-07-10 RX ORDER — METFORMIN HYDROCHLORIDE 500 MG/1
1 TABLET WITH A MEAL TABLET, FILM COATED ORAL ONCE A DAY
Status: ACTIVE | COMMUNITY

## 2025-07-10 RX ORDER — LOSARTAN POTASSIUM 50 MG/1
1 TABLET TABLET, FILM COATED ORAL ONCE A DAY
Status: ACTIVE | COMMUNITY

## 2025-07-10 RX ORDER — LEVOTHYROXINE SODIUM 0.1 MG/1
TAKE 1 TABLET (100 MCG) BY ORAL ROUTE ONCE DAILY TABLET ORAL 1
Qty: 0 | Refills: 0 | Status: ACTIVE | COMMUNITY
Start: 1900-01-01

## 2025-07-10 RX ORDER — ST. JOHN'S WORT 300 MG
1 CAPSULE CAPSULE ORAL ONCE A DAY
Status: ACTIVE | COMMUNITY

## 2025-07-10 RX ORDER — AMLODIPINE BESYLATE 5 MG/1
TABLET ORAL
Qty: 0 | Refills: 0 | Status: ACTIVE | COMMUNITY
Start: 1900-01-01

## 2025-07-10 NOTE — HPI-TODAY'S VISIT:
Patient is a 74-year-old female last seen here April 2025 and prior had in May 1, 2018 seen by Dr. Lisandra Sebastian and  returns for evaluation of liver issues and fatty liver and has underlying hfe.  A copy of the note will be sent to Dr LANCE Bee.  Asked re labs and none done.  April 22 MRI from [facility] sent to us. The lung bases were unremarkable were seen but the images were somewhat degraded through the breath-holding process. The liver was without suspicious lesion.  There was no sign to suggest significant iron deposition. No significant fat deposition seen as well with the calculated fat fraction being 2%. Small focus of enhancement seen in segment 6/7 measuring 0.8 x 0.5 cm and was unchanged dating back to April 9 2024 when allowing for differences in measurement technique.  No new suspicious lesions seen and no washout lesion was seen.  Portal and hepatic veins were patent/open as expected. There was no duct dilation intra or extrahepatic leak. No gallstones were seen. Pancreas head possible tiny T2 bright foci there was noted, and they reference some small lesions 2 to 3 mm and 2 mm they were all felt to be probably unchanged. Adrenal glands were preserved. Spleen was preserved. A tiny fat-containing umbilical hernia was seen. No hydroureteronephrosis was seen of the kidneys. Small hiatal hernia was seen. Celiac, SMA, SMV and splenic vessels were patent/open as expected.  Overall, stable small focus of enhancement seen in segment 6/7 unchanged 8.9 x 24 with no new suspicious lesion.  They felt that this spot could be a protrusion mildly or small FNH. There was no evidence of significant iron deposition in the liver fat was normal at 2%. Tiny foci seen in the pancreas could represent IPMN's and they recommend a follow-up MRI/MRCP in 1 year to document stability of these.Small hiatal hernia was seen.  Dr LUCAS is following and last saw 6m ago and phlebotomy then.  November 2024 MRI showed 3% fat, 3 mm lesion in the pancreas tail, in the unchanged segment 6/7 lesion in the liver.  No change in weight. Trying to do better diet and to do gym occ and exercising more.  ER notes sent: SHe confirmed 2 yrs ago had that. Patient did a CT abdomen and pelvis on October 4, 2023 which showed questionable left-sided colonic wall thickening that could be colitis.  No acute intra-abdominal or intrapelvic process seen.  Small hiatal hernia.  Sigmoid diverticulosis seen and no diverticulitis. October 4, 2023 labs showed WBC 6.63 hemoglobin 13.3 MCV 98.5 platelet 184.  Neutrophils 5.4 lymphocytes 0.9.  Sodium 140 potassium 4.0 chloride 103 CO2 25 glucose elevated 185 BUN 14.  0.9 calcium 9.8 total protein 7.3 albumin 4.1 AST 28 ALT 38 alk phos 87 bilirubin 0.4. They concluded that she had colitis and gave her Cipro, dicyclomine, metronidazole, and Zofran.    November 25 2024 MRI done showed liver fat to be normal range at 3% which was good to confirm. This was similar to your prior scan. It was not felt to be a fatty liver. Normal-sized spleen was seen and there was a possible 3 mm lesion in the pancreas tail that was very small. Adrenal glands were unremarkable. No evidence of any iron deposition seen in the liver or spleen. No enhancing liver lesions. No signs of cirrhosis. Portal vein was patent as expected. Gallbladder was present. No pathologic bile duct dilation seen on the images. 8 mm peripheral arterial focus seen in segment 6/7 unchanged in the liver from prior exam.  Overall, nearly normal liver exam with some possible minute cystic lesions in the pancreas there were difficult to evaluate due to small size. Small enhancing focus seen in the liver as well. felt to be a perfusion change that was unchanged.  Due to this we will recommend a 6-month follow-up to be done for you in May. Since we are planning to see you on April 8 we will see how you are doing check her labs and then order the MRI at that point.  Typically we would recommend a 6-month surveillance with any perfusion changes. Since they did mention that it was unchanged dating back to 2014 , one could argue to do it once a year and do an ultrasound instead next time.  October 7 2024  ultrasound shows pancreas not well-seen due to gas and body habitus and limited assessment unremarkable. Liver even in echotexture with no discrete lesions. Liver vessels patent as expected. Gallbladder unremarkable with no stones. Common bile duct normal at 4 mm. Right kidney 11.5 cm with no hydronephrosis. Spleen normal at 10.2 cm with splenic vessels patent.  In summary, liver was even in echotexture and with no discrete lesions. Liver vessels patent. Important to confirm that the liver appears stable.  She says labs to be done next month at Dr LUCAS.  She did labs with Dr LUCAS's labs and need those labs.  April 9 2024 MRI showed mild limitation due to motion. No significant pleural or pericardial efusion seen. Liver was within normal limits in size with the right lobe 16.2 cm and no significant liver fat noted.  Fat estimate was a 3% which is in the normal range of up to 6%. Subcentimeter focus seen in the right lobe measuring 6 mm and is felt likely to be a small focus of shunting/perfusion defect.  Typically this is followed up again in 6 to 12 months. Gallbladder was unremarkable with no stones.  No bile duct dilation seen. Spleen was normal in size.  Adrenal glands unremarkable. Pancreas showed cystic lesion in the body area measuring 3 mm in the main pancreatic duct was mildly prominent in the pancreatic head measuring 4 mm which is upper normal.  Pancreas otherwise unremarkable. Kidneys with no hydronephrosis. Liver vessels patent. No mechanical obstruction seen. They recommended again a follow-up MRI in 12 months for your perfusion defect in the liver and they mentioned that you had this punctate subcentimeter cyst in the pancreatic body which would also require follow-up at that time.  March 1 labs show iron saturation normal at 36% and ferritin normal at 37. She has not needed any more phlebotomy. WBC 4.8 hemoglobin 13.6 plate count 170 MCV 97.6 with normal neutrophils and lymphocytes this is good to see. INR normal at 1.0. Hep B core total was nonreactive indicating no prior exposure and hep B surface antigen was negative indicating no active disease. Your hepatitis A total antibody was negative indicating no immunity you should consider getting the hep A/B vaccine series. Hep C antibody was negative which was good to note. Your fib 4 index was 2.29 which would put you at intermediate risk category for having advanced fibrosis. Your sugar was elevated at 230 and it is important that you work on this issue long-term with your providers. BUN 14 creatinine 0.67 sodium 138 potassium 4.2 calcium 10.0 albumin 4.5 bilirubin 0.4. Alk phos was normal at 70 AST was normal at 21 but ALT was slightly elevated at 32 with ideal ALT less than 25. Platelet count was 170. The hemochromatosis assay finally confirmed what it is that you have. You have 1 gene of the C282 Y hemochromatosis gene and 1 gene of the H63D gene. This is confirmation now of what you have. The main shoulder border view could receive 1 or the other of these. But both were received a gene that was defective. The C282 Y is to more iron retaining potential gene to have. As you recall, you have been known to have this history of the fatty liver and iron issues and to see your hematologist for that. Do not have confirmed what type you have.    Records reviewed from Dr LUCAS. Jan 24 2024 wbc 4.1 and hg 13.8 and platelest 162, Neutrophils 2.5.  Afp 3 and glucose 211 and so that needs to be worked on as high sugars add to liver fat also. Bun 15 and cr 0.8. Na 141 and k 4.2 and cl 106 and co2 26.9 and na 141 and k 4.2 and ca 9.4 and tb 0.4 and alk 80 and ast 31 and alt 40. Ideal alt less than 25. Iron sat 33.7% and ferritin 41.9. TSH less than 0.0 and defer to team. Vitamin d 75.1 normal.  She did scan u.s jan 22 2024: done at imaging center.  Liver normal  but echogenic and no masses and vessels open and cbd 4mm and no stones and pancreas limited views normal and right kidney 11.6cm and no hydronephrosis and overall diffuse fatty liver and no suspcious mass.  SHe has hemochromatosis and did do one this year and prior had gone a while since she did that.  594 ferritin and took blood for while and not up in while and not up in several years and then bumped and she did in 2024.  She does not recll what type has.   2020 colonoscopy and did elsewhere for this and done locally.   [facility] notes : When she was last seen in 2018 she was being seen for colonoscopy and GERD evaluation.  Patient at that timeframe was reported to have a history of hemochromatosis but that information was not visible to Dr. Sebastian.  She was asked re social alcohol and having biweekly 1-2 drinks and better as prior 4 glasses of wine with ice a day and she was recommended to avoid same.  She is still amount so at risk to have more disease and we need to get the mri to see this and quantify the fat and look at this more.  Colonoscopy had been done in 2010 and so was recommended due in 2020.  Past medical history also showed history of anemia, anxiety, arthritis, diabetes, GERD, hemochromatosis, hypertension.  Past surgical history included abdominal hysterectomy 1990, colonoscopy 2010 with nonthrombosed external hemorrhoids and nonbleeding internal hemorrhoids. EGD in July 2010 in March 2010. 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 pylori.  Meds in 2018 included amlodipine 5 mg, duloxetine 60 mg, ibuprofen 800 mg 3 times daily as needed, levothyroxine 100 mcg a day, omeprazole 20 mg a day, Seroquel 200 mg at night.  Allergies none.  Family history mother with GERD and brother with diabetes.  Social alcohol was having daily. No tobacco.  Weight was 176 pounds height of 5 foot 4 BMI 30.38 in 2018 and now 2024 she is 173.  Discussed that the fatty liver has rf: weight andsome of that and she has the bottle of wine every night and that can lead to alcoholic fatty liver and worse yet cirrhosis over time,  Meeting Dr jo risk criteria and need that mri to look and be sure.  Do screens for a/bc and also do scores to look at livcer staus..  Plan 1.  Plan [facility] [place] in Oct and April mri for the pancreas and liver nonspecific issue. 2.  Doing phleobomy only occ. 3. Pt  liver not fatty and staying on healthy habits. 4. Our office in [place] to get fibroscan soon. 5.   RTC on 6m to see us.   Duration of visit was 30 minutes with 10 minutes spent prepping chart and loaded then loaded for the visit to [facility] records and then 20 additional minutes spent for this dox video with time spent reviewing chart and events and plans with the pt.